# Patient Record
Sex: MALE | Race: OTHER | Employment: FULL TIME | URBAN - METROPOLITAN AREA
[De-identification: names, ages, dates, MRNs, and addresses within clinical notes are randomized per-mention and may not be internally consistent; named-entity substitution may affect disease eponyms.]

---
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
## 2025-04-09 ENCOUNTER — APPOINTMENT (OUTPATIENT)
Dept: GENERAL RADIOLOGY | Age: 20
End: 2025-04-09
Payer: COMMERCIAL

## 2025-04-09 ENCOUNTER — HOSPITAL ENCOUNTER (EMERGENCY)
Age: 20
Discharge: HOME | End: 2025-04-09
Attending: EMERGENCY MEDICINE
Payer: COMMERCIAL

## 2025-04-09 VITALS
WEIGHT: 100 LBS | RESPIRATION RATE: 19 BRPM | HEIGHT: 60 IN | HEART RATE: 62 BPM | OXYGEN SATURATION: 98 % | TEMPERATURE: 97.6 F | SYSTOLIC BLOOD PRESSURE: 88 MMHG | DIASTOLIC BLOOD PRESSURE: 63 MMHG | BODY MASS INDEX: 19.63 KG/M2

## 2025-04-09 DIAGNOSIS — G40.919 BREAKTHROUGH SEIZURE (HCC): Primary | ICD-10-CM

## 2025-04-09 LAB
ALBUMIN SERPL-MCNC: 3.7 G/DL (ref 3.4–5)
ALBUMIN/GLOB SERPL: 1 {RATIO} (ref 1.1–2.2)
ALP SERPL-CCNC: 78 U/L (ref 40–129)
ALT SERPL-CCNC: 12 U/L (ref 10–40)
ANION GAP SERPL CALCULATED.3IONS-SCNC: 9 MMOL/L (ref 9–17)
AST SERPL-CCNC: 17 U/L (ref 15–37)
BASOPHILS # BLD: 0 K/UL
BASOPHILS NFR BLD: 0 % (ref 0–1)
BILIRUB SERPL-MCNC: 0.2 MG/DL (ref 0–1)
BUN SERPL-MCNC: 19 MG/DL (ref 7–20)
C DIFF GDH + TOXINS A+B STL QL IA.RAPID: NEGATIVE
CALCIUM SERPL-MCNC: 9.6 MG/DL (ref 8.3–10.6)
CHLORIDE SERPL-SCNC: 105 MMOL/L (ref 99–110)
CO2 SERPL-SCNC: 27 MMOL/L (ref 21–32)
CREAT SERPL-MCNC: 0.5 MG/DL (ref 0.9–1.3)
DATE LAST DOSE: NORMAL
EOSINOPHIL # BLD: 0 K/UL
EOSINOPHILS RELATIVE PERCENT: 0 % (ref 0–3)
ERYTHROCYTE [DISTWIDTH] IN BLOOD BY AUTOMATED COUNT: 18.8 % (ref 11.7–14.9)
GFR, ESTIMATED: >90 ML/MIN/1.73M2
GLUCOSE SERPL-MCNC: 98 MG/DL (ref 74–99)
HCT VFR BLD AUTO: 30.3 % (ref 42–52)
HGB BLD-MCNC: 9.4 G/DL (ref 13.5–18)
LYMPHOCYTES NFR BLD: 3.22 K/UL
LYMPHOCYTES RELATIVE PERCENT: 31 % (ref 24–44)
MAGNESIUM SERPL-MCNC: 2.2 MG/DL (ref 1.8–2.4)
MCH RBC QN AUTO: 26.9 PG (ref 27–31)
MCHC RBC AUTO-ENTMCNC: 31 G/DL (ref 32–36)
MCV RBC AUTO: 86.6 FL (ref 78–100)
METAMYELOCYTES ABSOLUTE COUNT: 0.21 K/UL
METAMYELOCYTES: 2 %
MONOCYTES NFR BLD: 0.73 K/UL
MONOCYTES NFR BLD: 7 % (ref 0–4)
NEUTROPHILS NFR BLD: 60 % (ref 36–66)
NEUTS SEG NFR BLD: 6.24 K/UL
PLATELET # BLD AUTO: 185 K/UL (ref 140–440)
PLATELET ESTIMATE: NORMAL
PMV BLD AUTO: 10.2 FL (ref 7.5–11.1)
POTASSIUM SERPL-SCNC: 3.6 MMOL/L (ref 3.5–5.1)
PROLACTIN SERPL-MCNC: 9.9 NG/ML
PROT SERPL-MCNC: 7.5 G/DL (ref 6.4–8.2)
RBC # BLD AUTO: 3.5 M/UL (ref 4.6–6.2)
RBC # BLD: ABNORMAL 10*6/UL
SODIUM SERPL-SCNC: 141 MMOL/L (ref 136–145)
SPECIMEN DESCRIPTION: NORMAL
TME LAST DOSE: NORMAL H
VALPROATE SERPL-MCNC: 55 UG/ML (ref 50–100)
VANCOMYCIN DOSE: NORMAL MG
WBC # BLD: NORMAL 10*3/UL
WBC OTHER # BLD: 10.4 K/UL (ref 4–10.5)

## 2025-04-09 PROCEDURE — 83735 ASSAY OF MAGNESIUM: CPT

## 2025-04-09 PROCEDURE — 87324 CLOSTRIDIUM AG IA: CPT

## 2025-04-09 PROCEDURE — 80164 ASSAY DIPROPYLACETIC ACD TOT: CPT

## 2025-04-09 PROCEDURE — 84146 ASSAY OF PROLACTIN: CPT

## 2025-04-09 PROCEDURE — 80053 COMPREHEN METABOLIC PANEL: CPT

## 2025-04-09 PROCEDURE — 85025 COMPLETE CBC W/AUTO DIFF WBC: CPT

## 2025-04-09 PROCEDURE — 87449 NOS EACH ORGANISM AG IA: CPT

## 2025-04-09 PROCEDURE — 71045 X-RAY EXAM CHEST 1 VIEW: CPT

## 2025-04-09 PROCEDURE — 99284 EMERGENCY DEPT VISIT MOD MDM: CPT

## 2025-04-09 PROCEDURE — 76937 US GUIDE VASCULAR ACCESS: CPT

## 2025-04-09 ASSESSMENT — PAIN SCALES - WONG BAKER: WONGBAKER_NUMERICALRESPONSE: HURTS LITTLE MORE

## 2025-04-09 NOTE — ED NOTES
Stool sample obtained from pt at this time and sent to lab for testing. Pt family made aware test results will not be available today.

## 2025-04-09 NOTE — ED TRIAGE NOTES
Pt arrived to ED via EMS from home due to tonic clonic seizure x30 seconds. Per ems pt has +loss of bladder, recently put on Amacar (4/4/25.)   Pt has hx of seizures, usually mutifocal in nature.   At time of arrival pt is back to baseline.  Pt is currently on amoxicillin due to dental surgery on 4/1/25. Pt parents noted dried blood in the mouth post seizure activity.

## 2025-04-09 NOTE — ED PROVIDER NOTES
Triage Chief Complaint:   Seizures    [place]:  Rishi Chris is a 19 y.o. male that presents with seizure activity.    Father was getting ready this morning when he heard patient moaning from the other room.  Father thought he was just moaning in pain from recent dental procedure however the moaning persisted and father went to check on him and found him with arms bent at 90 degrees and a rhythmic pumping as well as tremoring to his legs.  Patient was incontinent of stool.  No tongue biting.  Activity lasted approximately 30 seconds and spontaneously resolved.  Patient does have a history of seizures but mostly has absence seizure's and has approximately 3 a day.  Patient's last tonic-clonic seizure was approximately 8 years ago.  Patient has been adherent to his epileptic medications of Onfi and Depakote.  Patient of note did recently have eventful dental extraction last week where he had several teeth removed this was complicated by postoperative bleeding requiring transfer to [facility] where patient spent several days secondary to an aspiration pneumonia.  Patient is on Augmentin as well as Amicar which are new medications.  There has been no further bleeding.    Patient is now back to baseline per family.    Patient is not answering questions limiting history some.    ROS:  Limited as above.      Past Medical History:   Diagnosis Date    Asthma     Hydronephrosis     Wes syndrome     Patent ductus arteriosus     Pneumonia     chronic    Scoliosis     Seizures ([facility])     Unspecified cerebral artery occlusion with cerebral infarction      Past Surgical History:   Procedure Laterality Date    DENTAL SURGERY      EYE SURGERY      HERNIA REPAIR      PATENT DUCTUS ARTERIOUS LIGATION      coil placed     No family history on file.  Social History     Socioeconomic History    Marital status: Single     Spouse name: Not on file    Number of children: Not on file    Years of education: Not on file

## 2025-04-09 NOTE — ED NOTES
Pt POA administered pt routine meds as fallow: Clobazam 10 mg, Depakote 325 mg, Augmentin 400-57 mg/5 ml (10.9 ml), Amicor 120 mg/ml (15.88 ml), Prednisone 3 mg/ml (3.33 ml),  notified and received approval for POA to administer.

## 2025-04-09 NOTE — CONSULTS
IV Consult complete.  Nexiva 22g 1.75\" PIV Inserted in Left Forearm  x 1 attempt using sterile ultrasound guided technique.  Brisk blood return, flushes easy.  Labs draw Green/gold x2, red, lav.